# Patient Record
Sex: FEMALE | URBAN - METROPOLITAN AREA
[De-identification: names, ages, dates, MRNs, and addresses within clinical notes are randomized per-mention and may not be internally consistent; named-entity substitution may affect disease eponyms.]

---

## 2018-05-05 ENCOUNTER — APPOINTMENT (OUTPATIENT)
Age: 48
Setting detail: DERMATOLOGY
End: 2018-05-10

## 2018-05-05 DIAGNOSIS — L27.0 GENERALIZED SKIN ERUPTION DUE TO DRUGS AND MEDICAMENTS TAKEN INTERNALLY: ICD-10-CM

## 2018-05-05 DIAGNOSIS — L259 CONTACT DERMATITIS AND OTHER ECZEMA, UNSPECIFIED CAUSE: ICD-10-CM

## 2018-05-05 PROBLEM — L23.9 ALLERGIC CONTACT DERMATITIS, UNSPECIFIED CAUSE: Status: ACTIVE | Noted: 2018-05-05

## 2018-05-05 PROCEDURE — 99202 OFFICE O/P NEW SF 15 MIN: CPT

## 2018-05-05 PROCEDURE — OTHER PRESCRIPTION: OTHER

## 2018-05-05 PROCEDURE — OTHER COUNSELING: OTHER

## 2018-05-05 PROCEDURE — OTHER MIPS QUALITY: OTHER

## 2018-05-05 PROCEDURE — OTHER TREATMENT REGIMEN: OTHER

## 2018-05-05 RX ORDER — TRIAMCINOLONE ACETONIDE 1 MG/G
CREAM TOPICAL
Qty: 1 | Refills: 0 | Status: ERX | COMMUNITY
Start: 2018-05-05

## 2018-05-05 ASSESSMENT — LOCATION ZONE DERM
LOCATION ZONE: TRUNK
LOCATION ZONE: ARM

## 2018-05-05 ASSESSMENT — LOCATION SIMPLE DESCRIPTION DERM
LOCATION SIMPLE: LEFT FOREARM
LOCATION SIMPLE: CHEST
LOCATION SIMPLE: RIGHT FOREARM
LOCATION SIMPLE: RIGHT UPPER BACK

## 2018-05-05 ASSESSMENT — LOCATION DETAILED DESCRIPTION DERM
LOCATION DETAILED: LEFT PROXIMAL DORSAL FOREARM
LOCATION DETAILED: UPPER STERNUM
LOCATION DETAILED: MIDDLE STERNUM
LOCATION DETAILED: RIGHT SUPERIOR UPPER BACK
LOCATION DETAILED: RIGHT DISTAL DORSAL FOREARM

## 2018-05-05 NOTE — HPI: RASH
How Severe Is Your Rash?: moderate
Is This A New Presentation, Or A Follow-Up?: Rash
Additional History: Originally was treating rash with Benadryl orally and topically. Her eyes then started swelling so she went to the ER , they to,d her she was having a reaction to her Gabapentin. They gave her Benadryl and Triamcinolone

## 2018-05-05 NOTE — PROCEDURE: TREATMENT REGIMEN
Detail Level: Simple
Discontinue Regimen: .\\nBenadryl\\n.
Continue Regimen: .\\nTriamcinolone bod x 2 weeks\\n\\n.
Plan: .\\nApply cream moisturizers over damp skin
Otc Regimen: .\\n\\nAM\\n-Zantac\\n\\nPM\\n-Zyrtec\\n\\n.
Plan: .\\nAdvised to see allergist for Patch Testing, advised to inform allergist that she uses essential oils